# Patient Record
Sex: MALE | URBAN - METROPOLITAN AREA
[De-identification: names, ages, dates, MRNs, and addresses within clinical notes are randomized per-mention and may not be internally consistent; named-entity substitution may affect disease eponyms.]

---

## 2023-06-27 ENCOUNTER — OFFICE VISIT (OUTPATIENT)
Facility: LOCATION | Age: 53
End: 2023-06-27
Payer: OTHER GOVERNMENT

## 2023-06-27 DIAGNOSIS — H49.21 6TH NERVE PALSY OF RIGHT EYE: ICD-10-CM

## 2023-06-27 DIAGNOSIS — H43.813 VITREOUS DEGENERATION, BILATERAL: Primary | ICD-10-CM

## 2023-06-27 DIAGNOSIS — H52.223 REGULAR ASTIGMATISM, BILATERAL: ICD-10-CM

## 2023-06-27 PROCEDURE — 92310 CONTACT LENS FITTING OU: CPT | Performed by: OPHTHALMOLOGY

## 2023-06-27 PROCEDURE — 92004 COMPRE OPH EXAM NEW PT 1/>: CPT | Performed by: OPHTHALMOLOGY

## 2023-06-27 PROCEDURE — 92015 DETERMINE REFRACTIVE STATE: CPT | Performed by: OPHTHALMOLOGY

## 2023-06-27 ASSESSMENT — INTRAOCULAR PRESSURE
OS: 22
OD: 20

## 2023-06-27 ASSESSMENT — KERATOMETRY
OD: 42.31
OS: 42.13

## 2023-06-27 ASSESSMENT — VISUAL ACUITY
OS: 20/20
OD: 20/20

## 2023-06-27 NOTE — IMPRESSION/PLAN
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 Plan:  Will try CTLS temporary to see if it helps with Diplopia

## 2023-06-27 NOTE — IMPRESSION/PLAN
Impression: Vitreous degeneration, bilateral: H43.813. Plan: Old PVD OU with floaters: Discussed the nature of floaters and vitreous degeneration. Described scenarios when they are likely to be most prominent. Reassurance was given to the patient.

## 2023-07-18 ENCOUNTER — OFFICE VISIT (OUTPATIENT)
Facility: LOCATION | Age: 53
End: 2023-07-18
Payer: OTHER GOVERNMENT

## 2023-07-18 DIAGNOSIS — H49.21 6TH NERVE PALSY OF RIGHT EYE: Primary | ICD-10-CM

## 2023-07-18 PROCEDURE — 92012 INTRM OPH EXAM EST PATIENT: CPT | Performed by: OPHTHALMOLOGY

## 2023-07-18 ASSESSMENT — INTRAOCULAR PRESSURE
OS: 16
OD: 16

## 2023-07-18 NOTE — IMPRESSION/PLAN
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 Plan: 6th nerve palsy started 4 days after he received Anthrax vaccination 06/08/23 and woke up 06/15/23 with severe head pain and Esotropia OS.

## 2023-07-18 NOTE — IMPRESSION/PLAN
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 Plan: Will try transport tape OU whichever patient prefers. Patient will need to be stable for about 6 months with the double vision and we will re evaluate at that time.

## 2024-01-18 ENCOUNTER — OFFICE VISIT (OUTPATIENT)
Facility: LOCATION | Age: 54
End: 2024-01-18
Payer: OTHER GOVERNMENT

## 2024-01-18 DIAGNOSIS — H49.21 6TH NERVE PALSY OF RIGHT EYE: Primary | ICD-10-CM

## 2024-01-18 PROCEDURE — 92012 INTRM OPH EXAM EST PATIENT: CPT | Performed by: OPHTHALMOLOGY

## 2024-01-18 ASSESSMENT — INTRAOCULAR PRESSURE
OS: 16
OD: 15